# Patient Record
Sex: MALE | Race: WHITE | HISPANIC OR LATINO
[De-identification: names, ages, dates, MRNs, and addresses within clinical notes are randomized per-mention and may not be internally consistent; named-entity substitution may affect disease eponyms.]

---

## 2018-07-09 ENCOUNTER — RESULT REVIEW (OUTPATIENT)
Age: 33
End: 2018-07-09

## 2018-07-09 ENCOUNTER — OUTPATIENT (OUTPATIENT)
Dept: OUTPATIENT SERVICES | Facility: HOSPITAL | Age: 33
LOS: 1 days | Discharge: HOME | End: 2018-07-09

## 2018-07-09 RX ORDER — OXYCODONE AND ACETAMINOPHEN 5; 325 MG/1; MG/1
1 TABLET ORAL
Qty: 20 | Refills: 0
Start: 2018-07-09

## 2018-07-09 RX ORDER — DOCUSATE SODIUM 100 MG
1 CAPSULE ORAL
Qty: 21 | Refills: 0
Start: 2018-07-09 | End: 2018-07-15

## 2018-07-12 LAB — SURGICAL PATHOLOGY STUDY: SIGNIFICANT CHANGE UP

## 2021-02-27 ENCOUNTER — EMERGENCY (EMERGENCY)
Facility: HOSPITAL | Age: 36
LOS: 1 days | Discharge: HOME | End: 2021-02-27
Attending: EMERGENCY MEDICINE
Payer: COMMERCIAL

## 2021-02-27 VITALS
WEIGHT: 175.05 LBS | RESPIRATION RATE: 20 BRPM | HEIGHT: 68 IN | HEART RATE: 117 BPM | SYSTOLIC BLOOD PRESSURE: 133 MMHG | OXYGEN SATURATION: 100 % | DIASTOLIC BLOOD PRESSURE: 87 MMHG | TEMPERATURE: 98 F

## 2021-02-27 PROCEDURE — 99283 EMERGENCY DEPT VISIT LOW MDM: CPT

## 2021-02-27 PROCEDURE — 99284 EMERGENCY DEPT VISIT MOD MDM: CPT

## 2021-02-27 RX ORDER — MECLIZINE HCL 12.5 MG
25 TABLET ORAL ONCE
Refills: 0 | Status: COMPLETED | OUTPATIENT
Start: 2021-02-27 | End: 2021-02-27

## 2021-02-27 RX ORDER — SODIUM CHLORIDE 9 MG/ML
1000 INJECTION INTRAMUSCULAR; INTRAVENOUS; SUBCUTANEOUS ONCE
Refills: 0 | Status: COMPLETED | OUTPATIENT
Start: 2021-02-27 | End: 2021-02-27

## 2021-02-27 RX ADMIN — Medication 25 MILLIGRAM(S): at 23:33

## 2021-02-27 RX ADMIN — SODIUM CHLORIDE 1000 MILLILITER(S): 9 INJECTION INTRAMUSCULAR; INTRAVENOUS; SUBCUTANEOUS at 23:33

## 2021-02-27 NOTE — ED PROVIDER NOTE - PATIENT PORTAL LINK FT
You can access the FollowMyHealth Patient Portal offered by [facility] by registering at the following website: [email or web address]. By joining [facility]’s FollowMyHealth portal, you will also be able to view your health information using other applications (apps) compatible with our system.

## 2021-02-27 NOTE — ED PROVIDER NOTE - NSFOLLOWUPINSTRUCTIONS_ED_ALL_ED_FT
You were seen for vertigo.  Take all medication as directed.  For pain or fever you can ibuprofen (motrin, advil) or tylenol as needed, as directed on packaging.   Follow up with your primary care doctor within 5 days as directed.  If you had labs or imaging done, you were given copies of all of the available results. If anything is pending to result or pending official read, you will receive a call if results are positive.  If needed, call patient access services at [phone] to find a primary care doctor, or call at [phone] to make an appointment at the clinic.  Return to the ER for any worsening symptoms or concerns, including chest pain, shortness of breath, fever, chills. You were prescribed a medication called meclizine. Take your meclizine once daily as needed, for dizziness.     Vertigo    WHAT YOU NEED TO KNOW:    Vertigo is a condition that causes you to feel dizzy. You may feel that you or everything around you is moving or spinning. You may also feel like you are being pulled down or toward your side.    DISCHARGE INSTRUCTIONS:    Seek care immediately if:    You have a headache and a stiff neck.    You have shaking chills and a fever.    You vomit over and over with no relief.    You have blood, pus, or fluid coming out of your ears.    You are confused.  Contact your healthcare provider if:    Your symptoms do not get better with treatment.    You have questions about your condition or care.  Medicines:    Medicine may be given to help relieve your symptoms.    Take your medicine as directed. Contact your healthcare provider if you think your medicine is not helping or if you have side effects. Tell him or her if you are allergic to any medicine. Keep a list of the medicines, vitamins, and herbs you take. Include the amounts, and when and why you take them. Bring the list or the pill bottles to follow-up visits. Carry your medicine list with you in case of an emergency.  Manage your symptoms:    Do not drive, walk without help, or operate heavy machinery when you are dizzy.    Move slowly when you move from one position to another position. Get up slowly from sitting or lying down. Sit or lie down right away if you feel dizzy.    Drink plenty of liquids. Liquids help prevent dehydration. Ask how much liquid to drink each day and which liquids are best for you.    Vestibular and balance rehabilitation therapy (VBRT) is used to teach you exercises to improve your balance and strength. These exercises may help decrease your vertigo and improve your balance. Ask for more information about this therapy.  Follow up with your healthcare provider as directed: Write down your questions so you remember to ask them during your visits.

## 2021-02-27 NOTE — ED PROVIDER NOTE - PHYSICAL EXAMINATION
Gen: NAD; well appearing  Head: NCAT  Eyes: EOMI, PERRLA, no conjunctival pallor, no scleral icterus, bilateral horizontal nystagmus  ENT: mucous membranes moist, no discharge  Neck: neck supple  Resp: CTAB, no W/R/R  CV: RRR, +S1/S2, no M/R/G  GI: Abdomen soft non-distended, NTTP, no masses  MSK: No open wounds, no bruising, no lower extremity edema  Neuro: A&Ox4, sensation nl, motor 5/5 RUE/LUE/RLE/LLE, follows commands  Ext: no edema, no deformity, warm and well-perfused  Skin: no rash or bruising

## 2021-02-27 NOTE — ED PROVIDER NOTE - CLINICAL SUMMARY MEDICAL DECISION MAKING FREE TEXT BOX
Christopher DUNN PGY-1: 34 yo M no med hx, presenting with sudden-onset dizziness, described as room spinning sensation, most consistent with peripheral vertigo. No med hx, no trauma/injury to suggest central cause. Will give 1x bolus IVF and meclizine, and d/c home with outpatient ENT follow up.

## 2021-02-27 NOTE — ED PROVIDER NOTE - ATTENDING CONTRIBUTION TO CARE
attending Bhaskar: 35yM h/o HLD, p/w intermittent dizziness x 3 days. Reports sensation of the room spinning, exacerbated with head movement. Had another episode today, since resolved. Will treat symptomatically and reassess.

## 2021-02-27 NOTE — ED ADULT NURSE NOTE - OBJECTIVE STATEMENT
Patient is a 35 year old male complaining of dizziness. Patient denies past medical history. Patient is A&O x 4. Pt reports episode of dizziness Wednesday and today. pt states "room felt like it was spinning". pt states symptoms have resolved. pt endorses mild headache. Denies complaints of chest pain, sob, fevers, chills, n/v/d, syncope, burning urination, blood in urine, blood in stool. Abd is soft, non tender, non distended. Skin is warm and dry. Color is consistent with ethnicity. Safety and comfort maintained. Will continue to monitor.

## 2021-02-27 NOTE — ED PROVIDER NOTE - OBJECTIVE STATEMENT
36 yo M hx HLD, presenting with chief complaint of intermittent dizziness x 3 days. States that 3 days ago, felt dizzy with sensation of the room spinning which he states to be exacerbated with head movement after coming home from soccer game, sudden onset. Denies any head injury or trauma. Came in today because was playing board game with friends and had intense episode dizziness, which he notes to be improved now and denies any dizziness currently. No other symptoms, no fever/chills.

## 2021-02-27 NOTE — ED PROVIDER NOTE - NSFOLLOWUPCLINICS_GEN_ALL_ED_FT
[facility] - ENT  Otolaryngology (ENT)  [address] [place] [address]  Phone: [phone]  Fax:   Follow Up Time:

## 2021-02-28 VITALS
DIASTOLIC BLOOD PRESSURE: 67 MMHG | SYSTOLIC BLOOD PRESSURE: 108 MMHG | HEART RATE: 76 BPM | RESPIRATION RATE: 17 BRPM | TEMPERATURE: 98 F | OXYGEN SATURATION: 97 %

## 2021-02-28 RX ORDER — MECLIZINE HCL 12.5 MG
1 TABLET ORAL
Qty: 30 | Refills: 0
Start: 2021-02-28 | End: 2021-03-29

## 2021-03-01 ENCOUNTER — NON-APPOINTMENT (OUTPATIENT)
Age: 36
End: 2021-03-01

## 2021-03-05 ENCOUNTER — APPOINTMENT (OUTPATIENT)
Dept: OTOLARYNGOLOGY | Facility: CLINIC | Age: 36
End: 2021-03-05
Payer: COMMERCIAL

## 2021-03-05 ENCOUNTER — NON-APPOINTMENT (OUTPATIENT)
Age: 36
End: 2021-03-05

## 2021-03-05 VITALS
DIASTOLIC BLOOD PRESSURE: 94 MMHG | BODY MASS INDEX: 27.28 KG/M2 | HEART RATE: 82 BPM | WEIGHT: 180 LBS | SYSTOLIC BLOOD PRESSURE: 153 MMHG | HEIGHT: 68 IN | TEMPERATURE: 98 F

## 2021-03-05 DIAGNOSIS — F17.200 NICOTINE DEPENDENCE, UNSPECIFIED, UNCOMPLICATED: ICD-10-CM

## 2021-03-05 DIAGNOSIS — R42 DIZZINESS AND GIDDINESS: ICD-10-CM

## 2021-03-05 PROCEDURE — 99203 OFFICE O/P NEW LOW 30 MIN: CPT

## 2021-03-05 PROCEDURE — 99072 ADDL SUPL MATRL&STAF TM PHE: CPT

## 2021-03-05 NOTE — ASSESSMENT
[FreeTextEntry1] : Patient complains of 2 vertigo attacks x 1 week, today feeling better\par \par Vertigo no sx of primary otologic abn\par I suspect dehydration after playing soccer:\par -now resolved \par -no weakness noted today at visit \par -possibly due to dehydration, drink more water \par \par f/u prn

## 2021-03-05 NOTE — PHYSICAL EXAM
[Midline] : trachea located in midline position [Normal] : gait was normal [Hearing Loss Right Only] : normal [Hearing Loss Left Only] : normal [Nystagmus] : ~T no ~M nystagmus was seen [Fukuda Step Test] : Fukuda Step Test was Negative [Jarvis-Hallstanislawke] : Jackhorn-Hallpike: Negative [de-identified] : Normal

## 2021-03-05 NOTE — HISTORY OF PRESENT ILLNESS
[de-identified] : 35-year-old male\par Patient complains of vertigo x 1 week. States he came home from soccer practice and the room spinning lasting several seconds. Had another episode 4 days later. Went to the ED, was told he has vertigo and sent him home. Had the epley done at the ED and was negative. Today he is feeling better. No episodes since. \par Pt has no ear pain, ear drainage, hearing loss, tinnitus, nasal congestion, nasal discharge, epistaxis, sinus infections, facial pain, facial pressure, throat pain, dysphagia or fevers\par \par  [Ear Fullness] : no ear fullness [Tinnitus] : no tinnitus [Hearing Loss] : no hearing loss [Otalgia] : no otalgia [Vertigo] : vertigo [Eustachian Tube Dysfunction] : no eustachian tube dysfunction [Cholesteatoma] : no cholesteatoma [Early Onset Hearing Loss] : no early onset hearing loss [Stroke] : no stroke [Allergic Rhinitis] : no allergic rhinitis [Adenoidectomy] : no adenoidectomy [Allergies] : no allergies [Asthma] : no asthma [Hyperthyroidism] : no hyperthyroidism [Sialadenitis] : no sialadenitis [Hodgkin Disease] : no hodgkin disease [Non-Hodgkin Lymphoma] : no non-hodgkin lymphoma [None] : No risk factors have been identified. [Graves Disease] : no graves disease [Thyroid Cancer] : no thyroid cancer

## 2022-11-07 ENCOUNTER — APPOINTMENT (OUTPATIENT)
Dept: ORTHOPEDIC SURGERY | Facility: CLINIC | Age: 37
End: 2022-11-07
Payer: COMMERCIAL

## 2022-11-07 ENCOUNTER — NON-APPOINTMENT (OUTPATIENT)
Age: 37
End: 2022-11-07

## 2022-11-07 VITALS
WEIGHT: 183 LBS | HEIGHT: 69 IN | HEART RATE: 74 BPM | DIASTOLIC BLOOD PRESSURE: 72 MMHG | BODY MASS INDEX: 27.11 KG/M2 | SYSTOLIC BLOOD PRESSURE: 109 MMHG

## 2022-11-07 DIAGNOSIS — M25.569 PAIN IN UNSPECIFIED KNEE: ICD-10-CM

## 2022-11-07 DIAGNOSIS — M25.562 PAIN IN LEFT KNEE: ICD-10-CM

## 2022-11-07 PROCEDURE — 20610 DRAIN/INJ JOINT/BURSA W/O US: CPT | Mod: LT

## 2022-11-07 PROCEDURE — 99203 OFFICE O/P NEW LOW 30 MIN: CPT | Mod: 25

## 2022-11-07 NOTE — HISTORY OF PRESENT ILLNESS
[de-identified] : MOSHE LA  is a 36 year year old M who presents with a left knee injury. He was playing soccer yesterday when another player stepped in front of him which caused him to twist his knee to get out of the way. He felt his knee pop and fell to the ground. He had immediate pain and had difficulty bearing weight afterward. He was unable to continue playing. Overnight into today his left knee developed significant swelling and he has been unable to bend the knee much. He reports that his pain is about a 9/10. He went to an urgent care earlier today where he was given a knee immobilizer and an anti-inflammatory medication and told to follow up with an orthopaedic surgeon. He denies any previous knee injuries. 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

## 2022-11-07 NOTE — DISCUSSION/SUMMARY
[de-identified] : Left knee injury, likely ACL tear vs meniscus tear. I discussed with him that given the mechanism of injury, the buckling episode that he had, and the swelling of the knee, I am concerned that he tore his ACL in the left knee. He could also have a meniscus tear. Given this concern and the fact that he is unable to flex the knee past ~60 deg flexion, I recommend an MRI of the knee somewhat urgently to assess this further. If he does have an ACL tear, I discussed w/him that I would likely recommend surgery to reconstruct the ACL. Given his young age and activity level, without a functional ACL, he would be at higher risk for meniscus and cartilage damage in the future as well as for knee arthritis. He wants to continue being active and does not want to have restrictions on his activity in the future, which would necessitate a functional ACL. We will have an in depth discussion of indicaitons for surgery, the plan for surgery, and postoperative rehabilitation once the MRI is complete. Given the large effusion and inability to flex the knee, I offered an aspiration of the knee, which he was amenable to. Please see procedure note for details. I also put in an order for a autumn knee brace, which he can acquire at a surgical supply store. Once the MRI is complete, he will return to clinic to review it and I will give further recommendation on treatment plan at that time. The patient expressed understanding of the diagnosis and treatment plan and all questions were answered.\par \par  This note was generated using dragon medical dictation software.  A reasonable effort has been made for proofreading its contents, but typos may still remain.  If there are any questions or points of clarification needed please notify my office.\par \par

## 2022-11-07 NOTE — PROCEDURE
[Injection] : Injection [Left] : of the left [Knee Joint] : knee joint [Effusion] : Effusion [Patient] : patient [Risk] : risk [Benefits] : benefits [Betadine] : Betadine [___mL] : [unfilled] ~UmL of lidocaine [Lateral] : lateral [18] : an 18-gauge [___ mL Fluid] : [unfilled] mL of [Bloody] : bloody [Tolerated Well] : The patient tolerated the procedure well [None] : none

## 2022-11-07 NOTE — PHYSICAL EXAM
[de-identified] : General: No apparent distress\par Cardiovascular: extremities warm and well-perfused, no cyanosis\par Pulmonary: non labored respirations\par \par Musculoskeletal:\par \par Left knee:\par Skin is intact, warm, and dry\par Motor and sensory intact\par Toes wwp\par \par Disuse atrophy: None\par Effusion:moderate to large\par \par Active ROM:0 - 60 deg flexion\par Passive ROM:same as passive\par Crepitation: None\par \par Joint Line tenderness: None\par Patellar facet tenderness:  None\par Patellar tracking: Normal\par Patellar mobility/apprehension:none\par \par Ligamentous Exam:\par guarded, so unable to perform most maneuvers\par 2B Lachman, unable to assess anterior drawer d/t ROM restrictions\par stable w/varus and valgus stress\par \par \par Right knee:\par Skin is intact, warm, and dry\par Motor and sensory intact, toes wwp\par \par Disuse atrophy: None\par Effusion: None\par \par Active ROM: 0 - 130 deg flexion\par Passive ROM: same as active\par Crepitation: None\par \par Joint Line tenderness: None\par \par Ligamentous Exam:\par negative lachman, neg anterior and posterior drawer\par stable w/varus and valgus stress [de-identified] : 4 views of the left knee including bilateral AP and flexion AP views obtained today and interpreted by me demonstrate no acute fracture or dislocation, moderate to large knee effusion

## 2022-11-17 ENCOUNTER — APPOINTMENT (OUTPATIENT)
Dept: MRI IMAGING | Facility: CLINIC | Age: 37
End: 2022-11-17

## 2022-11-17 ENCOUNTER — OUTPATIENT (OUTPATIENT)
Dept: OUTPATIENT SERVICES | Facility: HOSPITAL | Age: 37
LOS: 1 days | End: 2022-11-17
Payer: COMMERCIAL

## 2022-11-17 ENCOUNTER — APPOINTMENT (OUTPATIENT)
Dept: ORTHOPEDIC SURGERY | Facility: CLINIC | Age: 37
End: 2022-11-17

## 2022-11-17 DIAGNOSIS — M25.562 PAIN IN LEFT KNEE: ICD-10-CM

## 2022-11-17 DIAGNOSIS — S83.512A SPRAIN OF ANTERIOR CRUCIATE LIGAMENT OF LEFT KNEE, INITIAL ENCOUNTER: ICD-10-CM

## 2022-11-17 PROCEDURE — 73721 MRI JNT OF LWR EXTRE W/O DYE: CPT

## 2022-11-17 PROCEDURE — 73721 MRI JNT OF LWR EXTRE W/O DYE: CPT | Mod: 26,LT

## 2022-11-17 PROCEDURE — 99214 OFFICE O/P EST MOD 30 MIN: CPT

## 2022-11-17 NOTE — PHYSICAL EXAM
[de-identified] : General: No apparent distress\par Cardiovascular: extremities warm and well-perfused, no cyanosis\par Pulmonary: non labored respirations\par \par Left knee:\par Mild effusion\par Range of motion: 0 to 100 degrees of flexion\par Distally neurovascularly intact\par 2B Lachman, 2+ anterior drawer\par Stable to varus and valgus stress [de-identified] : MRI of the left knee obtained today demonstrates complete ACL tear with pivot shift bone bruise pattern, tear of the medial meniscus posterior horn with extension of the tear into the posterior root, grade 1 LCL sprain

## 2022-11-17 NOTE — HISTORY OF PRESENT ILLNESS
[de-identified] : MOSHE LA  is a 36 year year old M who presents for follow-up of his left knee.  I saw him few weeks ago for a left knee injury sustained during soccer.  At that time the concern was for an ACL tear.  Aspiration of the knee was performed with relief of the pain and swelling and I referred him for an MRI.  He reports that since he was last seen, he has had a few episodes of the knee buckling on him.  He says that it has happened while walking and he has not tried to play any sports since the injury.  He reports that the pain is improved since he was last seen as well as the swelling.  He had the MRI this morning and comes in to review it.

## 2022-11-17 NOTE — DISCUSSION/SUMMARY
[de-identified] : Left knee ACL tear with medial meniscus tear, extending into the posterior root, as well as grade 1 LCL sprain. We discussed operative versus nonoperative management as well as risks and benefits of surgery and timeline to return to play. I have recommended an ACL reconstruction with hamstring autograft. This recommendation is based on the patient's desire to continue being active and participating in sports, and to therefore prevent the incidence of further meniscal and articular cartilage degeneration.  I will also address his meniscus tear at the time of surgery.  I will do my best to repair the meniscus as best I can, however based on the quality and type of meniscus tear, I may have to remove the torn part of his meniscus.  I discussed that the grade 1 LCL sprain that he has could be treated nonoperatively.\par \par The choice of graft was made after a discussion of the relative pros and cons of various grafts. Based on this, I recommended use of hamstring autograft\par \par The timing of the surgery will be decided based upon satisfying the following three criteria: 1. No flexion contracture; 2. Full knee range of motion beyond 100 degrees flexion and; 3. Minimal to no effusion in the knee.  He appears to have meet these criteria by now.\par \par We had a lengthy discussion about the risks associated with the proposed procedure. In particular we discussed risks that include, but are not limited to infection, blood loss, potential transient or permanent injury to nerves or blood vessels, joint stiffness, persistent pain, need for future operation, failure of healing, wound complications, failure of therapeutic intervention, risk of re-injury, deep vein thrombosis, pulmonary embolism, and death.\par \par We discussed the proposed rehabilitation timeline as well as expected postoperative restrictions. The patient voiced a good understanding of treatment options, risks and benefits, postoperative instructions, rehabilitation timeline, and restrictions. The patient was given the opportunity to ask questions, which were all answered to the best of my ability and to their satisfaction. The patient will work with my office to arrange a time for surgery and obtain any medical clearance information necessary. My pre-operative information packet, which details the process and answers many FAQ’s will be provided. The patient was encouraged to call the office with any further questions or concerns.

## 2022-11-30 ENCOUNTER — OUTPATIENT (OUTPATIENT)
Dept: OUTPATIENT SERVICES | Facility: HOSPITAL | Age: 37
LOS: 1 days | End: 2022-11-30
Payer: COMMERCIAL

## 2022-11-30 VITALS
OXYGEN SATURATION: 99 % | WEIGHT: 179.02 LBS | TEMPERATURE: 99 F | RESPIRATION RATE: 12 BRPM | HEART RATE: 76 BPM | SYSTOLIC BLOOD PRESSURE: 146 MMHG | HEIGHT: 69 IN | DIASTOLIC BLOOD PRESSURE: 81 MMHG

## 2022-11-30 DIAGNOSIS — S89.92XA UNSPECIFIED INJURY OF LEFT LOWER LEG, INITIAL ENCOUNTER: ICD-10-CM

## 2022-11-30 DIAGNOSIS — Z98.890 OTHER SPECIFIED POSTPROCEDURAL STATES: Chronic | ICD-10-CM

## 2022-11-30 DIAGNOSIS — Z01.818 ENCOUNTER FOR OTHER PREPROCEDURAL EXAMINATION: ICD-10-CM

## 2022-11-30 DIAGNOSIS — S83.512A SPRAIN OF ANTERIOR CRUCIATE LIGAMENT OF LEFT KNEE, INITIAL ENCOUNTER: ICD-10-CM

## 2022-11-30 PROCEDURE — G0463: CPT

## 2022-11-30 NOTE — H&P PST ADULT - NSICDXPASTMEDICALHX_GEN_ALL_CORE_FT
PAST MEDICAL HISTORY:  COVID-19 vaccine series completed     History of 2019 novel coronavirus disease (COVID-19) 2021     PAST MEDICAL HISTORY:  COVID-19 vaccine series completed     History of 2019 novel coronavirus disease (COVID-19) 2021    Left knee injury

## 2022-11-30 NOTE — H&P PST ADULT - HEIGHT IN FEET
As discussed please take the antibiotics exactly as prescribed.  Continue to do warm soaks with Epson salt 3 times per day for the next 3 days.  Follow-up with your primary care physician for recheck of the wound.  Return to the ER should he develop any new, worsening or other concerning symptoms such as those listed below.    Contact a doctor if:  You feel worse.  You do not get better.  You have more fluid, blood, or pus coming from the affected area.  Your finger or knuckle is swollen or is hard to move.  Get help right away if you have:  A fever or chills.  Redness spreading from the affected area.  Pain in a joint or muscle.   5

## 2022-11-30 NOTE — H&P PST ADULT - ASSESSMENT
37 yo male is scheduled for left knee anterior cruciate ligament reconstruction with hamstring autograft medial meniscus repair vs meniscectomy, possible other repairs on 12/8/2022

## 2022-11-30 NOTE — H&P PST ADULT - HISTORY OF PRESENT ILLNESS
36  37 yo male reports soccer injury to his left knee "a few weeks ago" which resulted in pain and swelling which has resolved now.  He is scheduled for left knee anterior cruciate ligament reconstruction with hamstring autograft medial meniscus repair vs meniscectomy, possible other repairs on 12/8/2022 @ [facility].

## 2022-11-30 NOTE — H&P PST ADULT - NSICDXFAMILYHX_GEN_ALL_CORE_FT
FAMILY HISTORY:  Uncle  Still living? Yes, Estimated age: Age Unknown  Family history of stroke, Age at diagnosis: Age Unknown

## 2022-11-30 NOTE — H&P PST ADULT - PAIN SCALE PREFERRED, PROFILE
Pre and post operative expectations and routines explained to patient, verbalized understanding. numerical 0-10

## 2022-11-30 NOTE — H&P PST ADULT - PROBLEM SELECTOR PLAN 1
left knee anterior cruciate ligament reconstruction with hamstring autograft medial meniscus repair vs meniscectomy, possible other procedures is planned for 12/8/2022  Covid testing TBS 12/5/2022 @ [facility]  pre op instructions were reviewed; best wishes offered

## 2022-12-05 LAB — SARS-COV-2 N GENE NPH QL NAA+PROBE: NOT DETECTED

## 2022-12-07 ENCOUNTER — TRANSCRIPTION ENCOUNTER (OUTPATIENT)
Age: 37
End: 2022-12-07

## 2022-12-08 ENCOUNTER — TRANSCRIPTION ENCOUNTER (OUTPATIENT)
Age: 37
End: 2022-12-08

## 2022-12-08 ENCOUNTER — OUTPATIENT (OUTPATIENT)
Dept: OUTPATIENT SERVICES | Facility: HOSPITAL | Age: 37
LOS: 1 days | End: 2022-12-08
Payer: COMMERCIAL

## 2022-12-08 ENCOUNTER — APPOINTMENT (OUTPATIENT)
Dept: ORTHOPEDIC SURGERY | Facility: HOSPITAL | Age: 37
End: 2022-12-08

## 2022-12-08 VITALS
SYSTOLIC BLOOD PRESSURE: 133 MMHG | OXYGEN SATURATION: 99 % | HEART RATE: 90 BPM | DIASTOLIC BLOOD PRESSURE: 75 MMHG | RESPIRATION RATE: 18 BRPM

## 2022-12-08 VITALS
WEIGHT: 175.27 LBS | DIASTOLIC BLOOD PRESSURE: 84 MMHG | HEART RATE: 80 BPM | HEIGHT: 69 IN | OXYGEN SATURATION: 100 % | TEMPERATURE: 98 F | SYSTOLIC BLOOD PRESSURE: 136 MMHG | RESPIRATION RATE: 24 BRPM

## 2022-12-08 DIAGNOSIS — S83.512A SPRAIN OF ANTERIOR CRUCIATE LIGAMENT OF LEFT KNEE, INITIAL ENCOUNTER: ICD-10-CM

## 2022-12-08 DIAGNOSIS — Z98.890 OTHER SPECIFIED POSTPROCEDURAL STATES: Chronic | ICD-10-CM

## 2022-12-08 PROCEDURE — 29882 ARTHRS KNE SRG MNISC RPR M/L: CPT | Mod: LT

## 2022-12-08 PROCEDURE — 29888 ARTHRS AID ACL RPR/AGMNTJ: CPT | Mod: LT

## 2022-12-08 DEVICE — PIN GUIDE FLEX MUST ORDER IN MULT OF 5: Type: IMPLANTABLE DEVICE | Site: LEFT | Status: FUNCTIONAL

## 2022-12-08 DEVICE — ANCHOR SUT FIBERSTITCH 2-0 CRVD: Type: IMPLANTABLE DEVICE | Site: LEFT | Status: FUNCTIONAL

## 2022-12-08 DEVICE — IMP ABS TIGHTROPE: Type: IMPLANTABLE DEVICE | Site: LEFT | Status: FUNCTIONAL

## 2022-12-08 DEVICE — ARTHREX SECONDARY FIXATION WITH PEEK SWIVELOCK ANCHOR 4.75 X 19.1MM: Type: IMPLANTABLE DEVICE | Site: LEFT | Status: FUNCTIONAL

## 2022-12-08 DEVICE — IMP TIGHTROPE ABS BUTTON 8X12MM: Type: IMPLANTABLE DEVICE | Site: LEFT | Status: FUNCTIONAL

## 2022-12-08 DEVICE — IMP TIGHTROPE ACL UHMWPE: Type: IMPLANTABLE DEVICE | Site: LEFT | Status: FUNCTIONAL

## 2022-12-08 RX ORDER — SODIUM CHLORIDE 9 MG/ML
1000 INJECTION, SOLUTION INTRAVENOUS
Refills: 0 | Status: DISCONTINUED | OUTPATIENT
Start: 2022-12-08 | End: 2022-12-09

## 2022-12-08 RX ORDER — CEFAZOLIN SODIUM 1 G
2000 VIAL (EA) INJECTION ONCE
Refills: 0 | Status: COMPLETED | OUTPATIENT
Start: 2022-12-08 | End: 2022-12-08

## 2022-12-08 RX ORDER — ONDANSETRON 8 MG/1
1 TABLET, FILM COATED ORAL
Qty: 6 | Refills: 3
Start: 2022-12-08

## 2022-12-08 RX ORDER — CHLORHEXIDINE GLUCONATE 213 G/1000ML
1 SOLUTION TOPICAL ONCE
Refills: 0 | Status: COMPLETED | OUTPATIENT
Start: 2022-12-08 | End: 2022-12-08

## 2022-12-08 RX ORDER — HYDROMORPHONE HYDROCHLORIDE 2 MG/ML
0.5 INJECTION INTRAMUSCULAR; INTRAVENOUS; SUBCUTANEOUS
Refills: 0 | Status: DISCONTINUED | OUTPATIENT
Start: 2022-12-08 | End: 2022-12-09

## 2022-12-08 RX ORDER — OXYCODONE HYDROCHLORIDE 5 MG/1
1 TABLET ORAL
Qty: 30 | Refills: 0
Start: 2022-12-08

## 2022-12-08 RX ORDER — MELOXICAM 15 MG/1
1 TABLET ORAL
Qty: 21 | Refills: 0
Start: 2022-12-08 | End: 2022-12-28

## 2022-12-08 RX ORDER — ONDANSETRON 8 MG/1
4 TABLET, FILM COATED ORAL ONCE
Refills: 0 | Status: COMPLETED | OUTPATIENT
Start: 2022-12-08 | End: 2022-12-08

## 2022-12-08 RX ORDER — OXYCODONE HYDROCHLORIDE 5 MG/1
5 TABLET ORAL ONCE
Refills: 0 | Status: DISCONTINUED | OUTPATIENT
Start: 2022-12-08 | End: 2022-12-09

## 2022-12-08 RX ORDER — OMEPRAZOLE 10 MG/1
1 CAPSULE, DELAYED RELEASE ORAL
Qty: 30 | Refills: 1
Start: 2022-12-08 | End: 2023-02-05

## 2022-12-08 RX ORDER — APREPITANT 80 MG/1
40 CAPSULE ORAL ONCE
Refills: 0 | Status: COMPLETED | OUTPATIENT
Start: 2022-12-08 | End: 2022-12-08

## 2022-12-08 RX ADMIN — CHLORHEXIDINE GLUCONATE 1 APPLICATION(S): 213 SOLUTION TOPICAL at 07:13

## 2022-12-08 RX ADMIN — APREPITANT 40 MILLIGRAM(S): 80 CAPSULE ORAL at 07:12

## 2022-12-08 RX ADMIN — SODIUM CHLORIDE 75 MILLILITER(S): 9 INJECTION, SOLUTION INTRAVENOUS at 12:40

## 2022-12-08 RX ADMIN — ONDANSETRON 4 MILLIGRAM(S): 8 TABLET, FILM COATED ORAL at 13:02

## 2022-12-08 NOTE — ASU PREOP CHECKLIST - PATIENT'S PERSONAL PROPERTY GIVEN TO
What Type Of Note Output Would You Prefer (Optional)?: Standard Output How Severe Is Your Rash?: mild Is This A New Presentation, Or A Follow-Up?: Follow Up Rash on unit/security/safe

## 2022-12-08 NOTE — BRIEF OPERATIVE NOTE - OPERATION/FINDINGS
ACL complete rupture  Stable ACL reconstruction Post procedure. Fluoro images with stable implant placement on femoral side.

## 2022-12-08 NOTE — ASU DISCHARGE PLAN (ADULT/PEDIATRIC) - CARE PROVIDER_API CALL
Landry Nash)  Orthopedics  [address], [address]  [place] [address]  Phone: [phone]  Fax: [phone]  Established Patient  Scheduled Appointment: 12/19/2022

## 2022-12-08 NOTE — ASU PATIENT PROFILE, ADULT - FALL HARM RISK - UNIVERSAL INTERVENTIONS
Bed in lowest position, wheels locked, appropriate side rails in place/Call bell, personal items and telephone in reach/Instruct patient to call for assistance before getting out of bed or chair/Non-slip footwear when patient is out of bed/[place] to call system/Physically safe environment - no spills, clutter or unnecessary equipment/Purposeful Proactive Rounding/Room/bathroom lighting operational, light cord in reach

## 2022-12-08 NOTE — ASU DISCHARGE PLAN (ADULT/PEDIATRIC) - ASU DC SPECIAL INSTRUCTIONSFT
Follow instructions in the attached brochure for bandage care, ice paks, and exercises.  You may walk with Toe touch  weight on [ Left ]  knee (10%); use crutches to assist as needed.  Brace: Wear on [ Left ] knee daily when up & walking; open brace for ice paks; may allow to hinge to 90 deg. for knee exercises; may wear at night for comfort when sleeping.  See the Surgeon in the office for removal of your stitches in 10-14 days.  Call the Surgeon. for fever, severe pain, or redness around the incisions.

## 2022-12-08 NOTE — ASU PATIENT PROFILE, ADULT - HAVE YOU RECEIVED AT LEAST TWO PFIZER AND/OR MODERNA VACCINATIONS (IN ANY COMBINATION) AND/OR ONE JOHNSON & JOHNSON VACCINATION?
Called Julisa to reschedule her appointment off of 7-24-20  (provider on vacation)    Julisa said she will check her work schedule and call me back.    Melba    
Yes

## 2022-12-08 NOTE — BRIEF OPERATIVE NOTE - TYPE OF ANESTHESIA
  reporte d.      PHYSICAL EXAM    CONSTITUTIONAL: Vital signs (BP, P, T, RR, et al) noted. No apparent distress. Does not  appear acutely ill or septic. Appears adequately hydrated.  HEAD: Normocephalic.  ENT: Oropharynx moist.  PULM: Lungs clear. Breathing unlabored.  CV: Heart regular.  DERM: Warm and  moist.  PSYCHIATRIC: Alert and oriented x 3. Mood is grossly neutral. Affect appropriate. Judgment and insight not grossly compromised.  General

## 2022-12-09 PROCEDURE — 29882 ARTHRS KNE SRG MNISC RPR M/L: CPT | Mod: LT

## 2022-12-09 PROCEDURE — 29888 ARTHRS AID ACL RPR/AGMNTJ: CPT | Mod: LT

## 2022-12-09 PROCEDURE — 97161 PT EVAL LOW COMPLEX 20 MIN: CPT

## 2022-12-09 PROCEDURE — C1713: CPT

## 2022-12-09 PROCEDURE — 76000 FLUOROSCOPY <1 HR PHYS/QHP: CPT

## 2022-12-21 ENCOUNTER — APPOINTMENT (OUTPATIENT)
Dept: ORTHOPEDIC SURGERY | Facility: CLINIC | Age: 37
End: 2022-12-21

## 2022-12-21 VITALS — TEMPERATURE: 98.2 F | DIASTOLIC BLOOD PRESSURE: 75 MMHG | SYSTOLIC BLOOD PRESSURE: 113 MMHG | HEART RATE: 87 BPM

## 2022-12-21 DIAGNOSIS — Z98.890 OTHER SPECIFIED POSTPROCEDURAL STATES: ICD-10-CM

## 2022-12-21 PROCEDURE — 73560 X-RAY EXAM OF KNEE 1 OR 2: CPT | Mod: LT

## 2022-12-21 PROCEDURE — 99024 POSTOP FOLLOW-UP VISIT: CPT

## 2022-12-21 NOTE — HISTORY OF PRESENT ILLNESS
[de-identified] : He is 2 weeks status post left ACL reconstruction with hamstring autograft and medial meniscus repair.  He is doing well overall.  He says his pain is much improved.  He was actually able to go back to work.  He says initially when he did go back to work he may have overdone it and felt some calf pain.  He says that this Pain has resolved.  He has been wearing the brace as prescribed and has been doing the exercises that were prescribed to him as well.  He says the swelling is much better.  He has noticed some numbness on the anterior aspect of his proximal shin.  He denies any fevers or chills. [de-identified] : General: No apparent distress\par Cardiovascular: extremities warm and well-perfused, no cyanosis\par Pulmonary: non labored respirations\par \par Left knee:\par Incisions well-healed, sutures removed today\par Mild soft tissue swelling, minimal to no effusion\par Range of motion: 2 to 60 degrees of flexion\par Negative Lachman\par Negative Homans' sign, no calf pain, swelling, or tenderness\par Lower extremity warm and well-perfused [de-identified] : 2 views of the left knee obtained today and interpreted by me demonstrate postoperative changes after ACL reconstruction.  The cortical buttons on the femur and tibia are intact [de-identified] : First postoperative visit after left ACL reconstruction with hamstring autograft and medial meniscus repair [de-identified] : He is doing well overall.  His arthroscopic pictures were given to him today and details of the procedure were discussed with him.  He says that he did not even really use the oxycodone that was prescribed.  He occasionally is taking some Tylenol, but overall says that he does not really need much pain medication at this point.  I discussed with him that the numbness that he has on the anterior proximal shin is likely due to the incision and can improve with time.  He should follow the PT protocol that was given to him.  At this time he should work on knee range of motion.  I also discussed that he can wean off the crutches over the next month and also wean off of the brace at home over the next month as well.  A prescription for PT was given today.  I will see him back in 4 weeks for repeat evaluation.  The patient expressed understanding of his diagnosis and treatment plan and all questions were answered.\par \par This note was generated using dragon medical dictation software.  A reasonable effort has been made for proofreading its contents, but typos may still remain.  If there are any questions or points of clarification needed please notify my office.

## 2022-12-29 ENCOUNTER — TRANSCRIPTION ENCOUNTER (OUTPATIENT)
Age: 37
End: 2022-12-29

## 2023-01-05 PROBLEM — S89.92XA UNSPECIFIED INJURY OF LEFT LOWER LEG, INITIAL ENCOUNTER: Chronic | Status: ACTIVE | Noted: 2022-11-30

## 2023-01-10 NOTE — H&P PST ADULT - NSCAFFEAMTFREQ_GEN_ALL_CORE_SD
1/10/2023         RE: Yahaira Fuentes  [address] [facility] [address]        Dear Colleague,    Thank you for referring your patient, Yahaira Fuentes, to the [facility] [place]. Please see a copy of my visit note below.         BMT / Cell Therapy Progress Note      Yahaira Fuentes is a 65 year old female referred by Dr. Emmanuel Arshad for acute myeloid leukemia.      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 She by PCP, WBC of 60.   8/5/2022 BMB:  - Acute myeloid leukemia with 87% blasts (predominately monoblasts) with mutated NPM1.   - A FLT3 ITD mutation detected , ratio 0.58  - Flow Positive for increased blast population with monocytoid differentiation. 84% of cells are in the monocyte/blast gate (dim to moderate CD45/low to moderate side scatter) and show expression of HLA-DR, partial CD13, CD4, CD64, partial CD14, CD33, CD15, CD11b and possible partial dim MPO.  They are negative for the remaining markers including CD34.    - CG: normal female karyotype      - AML Panel by NGS   TIER 1: Variants of Known Clinical Significance    1. FLT3 c.1738_1794dup, p.[id number] (NM_[id number])   VAF: Not Reported   2. NPM1 c.860_863dup, p.Mar122ri (NM_[id number])   VAF: 35.2%   3. DNMT3A c.2645G>A, p.Osu268Tco (NM_[id number])   VAF: 39.7%   TIER 2: Variants of Unknown Clinical Significance     1. KIT c.200C>G, p.Stl16Fnj (NM_[id number])   VAF: 47.5%   Given that the variant frequency is close to 50%, it is unclear whether this is a germline or somatic variant. The clinical significance, if any, is uncertain.   - Peripheral blood, morphology: Acute  Problem: Skin Integrity:  Goal: Will show no infection signs and symptoms  Description: Will show no infection signs and symptoms  Outcome: Ongoing  Note: No new skin breakdown or red bony prominences occuring this shift. Pt repositioned with rounding using pillow support. Protective wipes used PRN. Skin will be kept clean and dry. Repositioning and turning encouraged. Goal: Absence of new skin breakdown  Description: Absence of new skin breakdown  Outcome: Ongoing  Note: Turn/reposition and moisture barrier cream to maintain intact skin and decrease r/o skin breakdown with incont episodes. Problem: HEMODYNAMIC STATUS  Goal: Patient has stable vital signs and fluid balance  Outcome: Ongoing  Note: . Continue check vital signs per unit policy as well as PRN. HTN managed by IV antihypertensives. BP will respond to medication. Problem: ACTIVITY INTOLERANCE/IMPAIRED MOBILITY  Goal: Mobility/activity is maintained at optimum level for patient  Outcome: Ongoing  Note: Baseline wheelchair bound with R sided weakness from previous CVA. Encourage pt to assist with personal care, assist with turning. PT/OT ordered. Problem: COMMUNICATION IMPAIRMENT  Goal: Ability to express needs and understand communication  Outcome: Ongoing  Note: Aphasic, can say very simple/1 syllable words although slurred. Uses hand gestures, facial expressions, nodding/shaking head and working with writing to improve communication     Problem: Neurological  Goal: Maximum potential motor/sensory/cognitive function  Outcome: Ongoing  Note: Pt is a/o x4. No apparent decreased cognitive function. Sensory function intact. Motor function encouraged with working on tasks such as personal care, taking medications. Turn/reposition      Problem: Respiratory:  Goal: Ability to maintain a clear airway will improve  Description: Ability to maintain a clear airway will improve  Outcome: Ongoing  Note: LS clear.  Incentive spirometer "myeloid leukemia with 77% blasts. WBC 43390, hgb 11.3, plt 42553     9/6/2022 BMB:  Bone marrow aspirate, clot section, and trephine core biopsy:    Normocellular marrow with trilinear hematopoiesis    1% blasts    FLOW CYTOMETRY:Not performed    CYTOGENETICS/ MOLECULAR/ FISH: Not submitted     Peripheral blood, morphology: WBC 2.1, hb 8.4, plt [id number]    Negative for circulating blasts/blast equivalents    Date Treatment Name Response Side Effects / Toxicities   8/8/2022 Cytarabine and idarubicin (\"7+3\") with midostaurin therapy   Midostaurin: 8/15-8/28 9/6/2022 morphologic and immunophenotypic CR -Neutropenic fever cx neg completed cefepime 9/1  -ROHIT-Max creat 1.27.   11/29/22 SLAVA allo HSCT            HPI:  Please see my entry above for hematologic history.  Ms. Fuentes is seen today for follow up of her restaging studies done 1/5.  These unfortunately showed early relapse of AML with 25% blasts.  I spoke with her by phone yesterday to review this and we discussed further today.  She is understandably distressed by this new but denies any acute health concerns today and would like to discuss next therapy steps.      ASSESSMENT AND PLAN:  We reviewed the results of her bone marrow biopsy in detail today.  This shows recurrent acute myeloid leukemia with 25% abnormal myeloid blasts by flow.  FISH/cytogenetics are still pending but Ms. Fuentes previously did have a FLT3-ITD mutation as noted above.    We discussed next treatment options in detail.  I would favor starting azacitidine/venetoclax with plan for eventual DLI.  I did discuss with Ms. Fuentes and her daughter that the overall success rate of DLI is approximately 20%.  She understands that in general the prognosis in this scenario is not good but would like to pursue further treatment if able.  We reviewed the risks of DLI, particularly GVHD.  As she is still very early after transplant she will need to continue immunosuppression with sirolimus until " encouraged  Goal: Absence of aspiration  Description: Absence of aspiration  12/12/2021 2044 by Chris Minor RN  Outcome: Ongoing  Note: Providing oral care with suction, suction at bedside at all times.  HOB up at least 30 degrees  12/12/2021 2042 by Chris Minor RN  Outcome: Ongoing     Problem: Safety:  Goal: Ability to chew and swallow food without choking will improve  Description: Ability to chew and swallow food without choking will improve  12/12/2021 2044 by Chris Minor RN  Outcome: Ongoing  12/12/2021 2042 by Chris Minor RN  Outcome: Ongoing  Goal: Ability to demonstrate good, daily oral hygiene techniques will improve  Description: Ability to demonstrate good, daily oral hygiene techniques will improve  Outcome: Ongoing  Goal: Maintenance of upright position during and after feeding  Description: Maintenance of upright position during and after feeding  Outcome: Ongoing D+60.  I recommend stopping at that point without a taper in order to enable graft versus leukemia effect.  This will increase her risk of GVH which we discussed today but in the setting of relapsed leukemia I would consider this a necessary risk.    We had a ron discussion regarding the poor prognosis of early relapse and the relatively low rate of success with DLI in this context.  She does not qualify for any of our current clinical trials and will not in the near future.  As she does need to start therapy soon I recommend starting therapy as above as soon as possible.  She and her daughter were in agreement with this.  They would like to resume care with their primary oncologist, Dr. Arshad.  I spoke with him by phone today to discuss next steps.  He will arrange venetoclax/azacitidine and set up appointments for Ms. Fuentes; I asked to repeat a bone marrow biopsy after 2 cycles to assess response and hopefully proceed with DLI at that time.    Ms. Fuentes should keep her currently scheduled follow up with us for labs and possible transfusions until she has set appointments with Dr. Arshad.      Plan:   - Recommend starting venetoclax/azacitidine  - Follow up pending FISH/cytogenetics  - Continue sirolimus until D+60, then stop without taper  - Please repeat a bone marrow biopsy after 2 cycles  - We will arrange DLI and tentatively plan to do this after 2 cycles  - Hold bactrim, start pentamidine    I spent 40 minutes in the care of this patient today, which included time necessary for preparation for the visit, obtaining history, ordering medications/tests/procedures as medically indicated, review of pertinent medical literature, counseling of the patient, communication of recommendations to the care team, and documentation time.    Bernard Yuan MD    ROS:    10 point ROS neg other than the symptoms noted above in the HPI.        Current Outpatient Medications   Medication Sig Dispense Refill     acyclovir  "(ZOVIRAX) 800 MG tablet Take 1 tablet (800 mg) by mouth 2 times daily 60 tablet 0     Ascorbic Acid (RITESH-C PO) Take 1 tablet by mouth daily \"Vitamin C extra with Zinc\"       cholecalciferol 25 MCG (1000 UT) TABS Take 1,000 Units by mouth daily       fluconazole (DIFLUCAN) 200 MG tablet Take 1 tablet (200 mg) by mouth daily 30 tablet 0     multivitamin w/minerals (THERA-VIT-M) tablet Take 1 tablet by mouth daily       pantoprazole (PROTONIX) 40 MG EC tablet Take 1 tablet (40 mg) by mouth 2 times daily (before meals) 60 tablet 0     potassium chloride ER (KLOR-CON M) 20 MEQ CR tablet Take 60 mEq (3 pills) on 1/3/23; 40mEq (2 pills) on 1/4/23; 20 mEq (1 pill) on 1/5/23; clinic will direct further thereafter. 30 tablet 1     psyllium (METAMUCIL/KONSYL) capsule Take 1 capsule by mouth daily for 30 days 30 capsule 0     sirolimus (GENERIC EQUIVALENT) 1 MG tablet Take 3 tablets (3 mg) by mouth daily THIS IS A TEST SCRIPT ONLY, DO NOT FILL. 30 tablet 0     sulfamethoxazole-trimethoprim (BACTRIM DS) 800-160 MG tablet Take 1 tablet by mouth Every Mon, Wed, Fri Morning DO NOT BEGIN TAKING UNTIL INSTRUCTED TO DO SO BY BMT CLINIC ON FOLLOW UP VISIT. Plan to start next month if GI sxms have improved. 16 tablet 0     triamcinolone (KENALOG) 0.1 % external ointment Apply topically 2 times daily as needed (rash) 80 g 0     ursodiol (ACTIGALL) 300 MG capsule Take 1 capsule (300 mg) by mouth 3 times daily 105 capsule 0     zinc oxide (DESITIN) 20 % external ointment Apply topically every hour as needed for irritation 425 g 0         Physical Exam:     Vital Signs: /62   Pulse 101   Temp 99.2  F (37.3  C) (Axillary)   Resp 16   Wt 93.6 kg (206 lb 4.8 oz)   SpO2 100%   BMI 36.55 kg/m      KPS:  80  General Appearance: sitting up in chair, alert  Eyes: PERRL, conjunctiva and lids normal, sclera nonicteric  Cardio/Vascular: extremities WWP  Resp Effort And Auscultation: Breathing comfortably on room air  Musculoskeletal: " Musculoskeletal normal  Edema: none  Skin: Skin color, texture, turgor normal. No rashes or lesions in visualized areas.  Psych/Affect: Mood and affect are appropriate.    Blood Counts     Recent Labs   Lab Test 01/10/23  0742 01/05/23  0642 01/03/23  0912 01/01/23  0840 12/30/22  0740 12/28/22  0758 12/27/22  0506 12/26/22  0304   HGB 6.6* 7.4* 7.7*   < > 7.7*   < > 7.9* 8.0*   HCT 20.3* 22.8* 23.5*   < > 23.8*   < > 24.5* 24.2*   WBC 2.8* 1.8* 1.6*   < > 1.3*   < > 3.1* 2.4*   ANEUTAUTO  --   --   --   --  1.0*  --  2.7 2.2   ALYMPAUTO  --   --   --   --  0.1*  --  0.0* 0.0*   AMONOAUTO  --   --   --   --  0.2  --  0.3 0.2   AEOSAUTO  --   --   --   --  0.0  --  0.0 0.0   ABSBASO  --   --   --   --  0.0  --  0.0 0.0   NRBCMAN  --   --   --   --  0.0  --  0.0 0.0   PLT 43* 32* 30*   < > 15*   < > 15* 22*    < > = values in this interval not displayed.       ABO/RH    Recent Labs   Lab Test 01/10/23  0742   ABORH AB POS         Chemistries     Basic Panel  Recent Labs   Lab Test 01/10/23  0742 01/05/23  0642 01/03/23  0912    138 142   POTASSIUM 3.4 3.8 2.7*   CHLORIDE 105 104 109*   CO2 25 23 23   BUN 8.0 8.5 8.1   CR 0.72 0.68 0.53   * 153* 155*        Calcium, Magnesium, Phosphorus  Recent Labs   Lab Test 01/10/23  0742 01/05/23  0642 01/03/23  0912 12/28/22  0758 12/27/22  0506 12/26/22  0304 12/25/22  0158   IRA 8.3* 8.5* 7.2*   < > 8.6* 8.9 8.4*   MAG  --   --   --   --  1.9 2.0 1.9   PHOS  --   --   --   --  3.1 3.3 3.0    < > = values in this interval not displayed.        LFTs  Recent Labs   Lab Test 01/05/23  0642 12/26/22  0304 12/22/22  0429   BILITOTAL 0.3 0.2 0.2   ALKPHOS 64 74 65   AST 29 19 15   ALT 18 10 6*   ALBUMIN 3.7 3.6 3.7       Infectious Disease Markers     [facility] IDM    Recent Labs   Lab Test 11/03/22  1030   TCRUZI Non-Reactive         Hepatitis and HIV    Recent Labs   Lab Test 11/03/22  1030   HEPBANG Nonreactive   HBCAB Nonreactive   AUSAB 2.15   HCVAB  Nonreactive   HIAGAB Nonreactive         CMV  Recent Labs   Lab Test 11/03/22  1030   CMVIGG No detectable antibody.         EBV    Recent Labs   Lab Test 11/03/22  1030   EBVCAG Positive*       Bone Marrow Biopsy       Morphology    Results for orders placed or performed in visit on 01/05/23 (from the past 8760 hour(s))   Bone marrow biopsy   Result Value    Final Diagnosis      Bone marrow, posterior iliac crest, right decalcified trephine biopsy and touch imprint; direct aspirate smear, and concentrated aspirate smear; and peripheral blood smear:      -Persistent/recurrent acute myeloid leukemia involving approximately 40% of moderately hypercellular bone marrow [80% cellular] with marked dysgranulopoiesis and 26.8% blasts by differential count.  - Peripheral blood showing marked pancytopenia with very rare circulating blasts(<1%).  - See comment      Comment      Flow cytometry analysis on concurrent specimen ([id number]) showed increased abnormal myeloid blasts [25%]. Overall, the features of this marrow are consistent with relapsed/recurrent acute myeloid leukemia.    Concurrent ancillary studies are in progress and will be reported separately. Correlation with the results of ancillary tests and clinical findings is recommended.      Clinical Information      Per [facility] records, the patient is a 65-year-old woman with history of acute myeloid leukemia with monocytic differentiation, mutated NPM1 and FLT3 ITD mutation, who is now Day 37 s/p allo-SCT. She has required transfusion support including multiple units of pRBCs (most recent 12/24/22) and platelets (most recent 12/25/22) as well as G-SCF therapy (12/27/22). This is a routine follow up biopsy.      Peripheral Hematologic Data      CBC WITH MANUAL DIFFERENTIAL (01/05/2023 07:03 AM CST):     RESULT VALUE REF. RANGE UNITS    WBC Count    Hemoglobin    Hematocrit    Platelet Count    RBC Count   MCV  MCH  MCHC  RDW  1.8  ( L )     7.4  ( L )      22.8  ( L )    32  ( LL )   2.54  ( L )       90 (NORMAL)     29.1 (NORMAL)     32.5 (NORMAL)     14.6 (NORMAL) 4.0-11.0  11.7-15.7  35.0-47.0  150-450  3.80-5.20    26.5-33.0  31.5-36.5  10.0-15.0 10e3/uL  g/dL  %  10e3/uL  10e6/uL  fL  pg  g/dL  %   % Neutrophils  % Lymphocytes  % Monocytes  % Eosinophils  % Basophils  % Metamyelocytes  % Myelocytes  % Promyelocytes  % Blasts  % Plasma Cells  % Other Cells  Absolute Neutrophils   Absolute Lymphocytes   Absolute Monocytes  Absolute Eosinophils  Absolute Basophils  Absolute Metamyelocytes  Absolute Myelocytes  Absolute Promyelocytes  Absolute Blasts  Absolute Plasma Cells  Absolute Other Cells  NRBCs per 100 WBC  Absolute NRBCs 87  5  2  6  0               1.6 (NORMAL)      0.1  ( L )     0.0 (NORMAL)     0.1 (NORMAL)     0.0 (NORMAL)   ()   ()   ()       ()   ()   ()   ()      () N/A  N/A  N/A  N/A  N/A  N/A  N/A  N/A  N/A  N/A  N/A  1.6-8.3  0.8-5.3  0.0-1.3  0.0-0.7  0.0-0.2  <=0.0  <=0.0  <=0.0  <=0.0  <=0.0  <=0.0  <=0  <=0.0 %  %  %  %  %  %  %  %  %  %  %  10e3/uL  10e3/uL  10e3/uL  10e3/uL  10e3/uL  10e3/uL  10e3/uL  10e3/uL  10e3/uL  10e3/uL  10e3/uL  %  10e3/uL         Microscopic Description      PERIPHERAL BLOOD SMEAR MORPHOLOGY:  The red blood cells appear normochromic. Poikilocytosis  includes occasional ovalocytes and rare dacrocytes . Polychromasia is present, but not increased. Rouleaux formation is not increased. The morphology of the platelets is predominantly normal, though a subset of hypogranular forms are appreciated.    Lymphocyte morphology is polymorphous. Neutrophils displays predominantly normal cytoplasmic granularity and unremarkable nuclear morphology; rare cells show pseudo-Pelger-Huët nuclear morphology.  Rare circulating blasts are seen.    Bone marrow aspirates and bone marrow trephine core biopsy touch imprints are reviewed.    BONE MARROW DIFFERENTIAL (500 cells on bone marrow biopsy touch imprints )  Percent Cell (reference range)  26.8     Blasts (0 - 1)  0.4      Neutrophil promyelocytes (2 - 4)  37.0    Neutrophils and precursors (54 - 63)  23.6    Erythroid precursors (18 - 24)  1.2      Monocytes (1 - 1.5)  3.8      Eosinophils (1 - 3)  0.2      Basophils (0 - 1)  7.0      Lymphocytes (8 - 12)  0.0      Plasma cells (0 - 1.5)    The aspirate smears are hemodilute, therefore the above differential is performed on the bone marrow trephine core biopsy touch imprints..    Numerous intermediate to large sized blasts increased NC ratios, ovoid to indented nuclei, fine chromatin, one or more prominent nucleoli, and a moderate amount of basophilic cytoplasm are present; a subset contain a few cytoplasmic vacuoles.    Granulocytes are decreased in number, but with progressive and complete maturation; no overt dysplasia seen though a small subset show pseudo-Pelger-Huët nuclear morphology.  Erythrocytes show progressive and complete maturation; no overt dysplasia seen. Megakaryocytes include a subset of small hypolobated forms and forms with widely  nuclear lobes.     TREPHINE SECTIONS:  Hematoxylin and eosin stains are reviewed. The quality of the trephine core biopsy is good, though slight crush artifact is present.  The marrow cellularity is estimated at 80%. The cellular composition reflects the the marrow core biopsy differential. Megakaryocytes are present within the cellular areas; frequent clusters and a few large aggregates are present.  There is increased population of immature appearing cells that forms clusters and large sheets and comprise approximately 40% of bone marrow cellularity    IMMUNOHISTOCHEMISTRY:  Immunohistochemical stains are performed on the paraffin-embedded trephine core with appropriate controls.    CD34 highlights a subset of immature appearing cells [approximately 5%] as well as apparently stains occasional megakaryocytes.       highlights clusters and aggregates of increased blasts comprising approximately  40 to 50% of overall cellularity.    CD61 highlights numerous clusters and large aggregates of megakaryocytes with spectrum of sizes.  A subset of blasts also appears to stain with CD61.    Note: These immunohistochemical stains are deemed medically necessary. Some of the antigens may also be evaluated by flow cytometry. Concurrent evaluation by immunohistochemistry on clot and/or trephine sections is indicated in this case in order to correlate immunophenotype with cell morphology and determine extent of involvement, spatial pattern, and focality of potential disease distribution.    A resident/fellow in an ACGME accredited training program was involved in the initial review, preparation, and/or interpretation of this case.  I, as the senior physician, attest that I: (i) reviewed patient clinical records if indicated; (ii) reviewed relevant lab test results; (iii) examined the relevant preparation(s) for the specimen(s); and (iv) agree with the report, diagnosis(es), and interpretation as documented by the resident/fellow and edited/confirmed by me. Reporting resident/fellow: Roselia Klein DO, MLS([facility])      Gross Description      Procedure/Gross Description   Aspirate(s) and trephine(s) procured by MELO Andujar    Specimen sent for Special Studies:         Flow Cytometry: right aspirate        Cytogenetics: right aspirate        Molecular Diagnostics: right aspirate          Biopsy aspiration site: right posterior iliac crest                                                      (Reference Range)          Amount of aspirate           4.3   mL        Fat and P.V. cell layer        0    %               (1 - 3)        Particles                             0   %        Myeloid-erythroid layer    trace    %               (5 - 8)          Clot Section: no    Trephine biopsy site: right posterior iliac crest    Designated right posterior iliac crest is 1 cylinder of gritty tissue, labeled with the patient's name and  hospital number, obtained with 11 gauge needle and a length of 11 mm; entirely submitted in 1 cassette; acetic zinc formalin fixed, decalcified, processed, and stained for hematoxylin and eosin per laboratory protocol.        MCRS Yes (A)    Performing Labs      The technical component of this testing was completed at [facility]         CSF Studies       Recent Labs   Lab Test 11/04/22  1501   CCOL Colorless   CAPP Clear   CWBC 0   CRBC 0   CCOM Essentially acellular specimen. Negative for blasts. Please correlate with flow cytometry case [id number] and cytology case [id number].   Fabio Cavazos MD on 11/7/2022 at 9:57 AM   Reviewed by JUVENTINO CORTEZ, Hematopathology Fellow       Recent Labs   Lab Test 11/04/22  1501   CGLU 65       Recent Labs   Lab Test 11/04/22  1501   CTP 21.8       Yahaira understood the above assessment and recommendations.  Multiple questions answered.  No barriers to learning identified.      Known issues that I take into account for medical decisions, with salient changes to the plan considering these complexities noted above.    Patient Active Problem List   Diagnosis     Acute myeloid leukemia in remission (H)       ------------------------------------------------------------------------------------------------------------------------------------------------    Patient Care Team       Relationship Specialty Notifications Start End    Lyndon Ross PCP - General   9/28/11     Phone: [phone] Fax: [phone] [address] [facility] [address]    Charlie Worrell MD MD Ophthalmology  2/14/20     Phone: [phone] Fax: [phone] [address] [facility] [address]    Lorin Monsivais APRN CNP Nurse Practitioner Radiation Oncology Admissions 10/17/22     Phone: [phone] Fax: [phone]         [address] [place] [address]    Paola Arellano MD Assigned  Cancer Care Provider   11/12/22     Phone: [phone] Fax: [phone] [address] [facility] [address] [facility] [address]    Danitza Bowles MSW  BMT - Adult Admissions 11/14/22     Phone: [phone]         Chapincito Car RN BMT Nurse Coordinator   12/1/22     Meggan Falcon MD BMT Physician Transplant  12/9/22     Phone: [phone] Fax: [phone] [address] [facility] [address] [facility] [address]           1-2 cups/cans per day

## 2023-01-24 ENCOUNTER — APPOINTMENT (OUTPATIENT)
Dept: ORTHOPEDIC SURGERY | Facility: CLINIC | Age: 38
End: 2023-01-24
Payer: COMMERCIAL

## 2023-01-24 VITALS — DIASTOLIC BLOOD PRESSURE: 79 MMHG | SYSTOLIC BLOOD PRESSURE: 124 MMHG | HEART RATE: 83 BPM

## 2023-01-24 PROCEDURE — 99024 POSTOP FOLLOW-UP VISIT: CPT

## 2023-01-24 NOTE — HISTORY OF PRESENT ILLNESS
[de-identified] : 6 weeks status post left ACL reconstruction and medial meniscus repair.  He is doing well overall.  He really has no pain in the knee.  He says that a few days ago he slept without the brace for the first time and he felt a little bit of pain on the medial aspect of the knee but this went away by itself.  He has been doing PT and has been progressing his range of motion.  He is also able to raise his leg.  He says the numbness around his incisions is improving.  He denies any calf pain or swelling. [de-identified] : General: No apparent distress\par Cardiovascular: extremities warm and well-perfused, no cyanosis\par Pulmonary: non labored respirations\par \par Left knee:\par Mild swelling, no effusion\par Incisions well-healed\par Negative Lachman, negative anterior drawer\par No tenderness to palpation about knee\par Range of motion: 0 to 95 degrees of flexion [de-identified] : 6 weeks status post left ACL reconstruction and medial meniscus repair [de-identified] : He is doing very well overall.  Discussed with him that he should continue with the PT per the protocol.  The goal is over the next 6 weeks he should wean himself out of the brace completely and he should achieve full knee range of motion.  He should also work towards straight leg raising without lag.  He should also work on patella mobilizations.  I will see him back in 6 weeks for repeat evaluation.  All questions answered.

## 2023-03-14 ENCOUNTER — APPOINTMENT (OUTPATIENT)
Dept: ORTHOPEDIC SURGERY | Facility: CLINIC | Age: 38
End: 2023-03-14
Payer: COMMERCIAL

## 2023-03-14 VITALS — DIASTOLIC BLOOD PRESSURE: 80 MMHG | HEART RATE: 65 BPM | SYSTOLIC BLOOD PRESSURE: 126 MMHG

## 2023-03-14 PROCEDURE — 99213 OFFICE O/P EST LOW 20 MIN: CPT

## 2023-08-08 NOTE — PHYSICAL THERAPY INITIAL EVALUATION ADULT - RANGE OF MOTION EXAMINATION, REHAB EVAL
What Type Of Note Output Would You Prefer (Optional)?: Bullet Format
What Is The Reason For Today's Visit?: Full Body Skin Examination
What Is The Reason For Today's Visit? (Being Monitored For X): the re-examination of lesions previously examined
left knee n/t due to surgical precautions/bilateral upper extremity ROM was WFL (within functional limits)/Right LE ROM was WFL (within functional limits)/deficits as listed below

## 2024-02-20 ENCOUNTER — NON-APPOINTMENT (OUTPATIENT)
Age: 39
End: 2024-02-20

## 2024-02-21 ENCOUNTER — APPOINTMENT (OUTPATIENT)
Dept: ORTHOPEDIC SURGERY | Facility: CLINIC | Age: 39
End: 2024-02-21
Payer: COMMERCIAL

## 2024-02-21 VITALS
WEIGHT: 180 LBS | HEART RATE: 84 BPM | SYSTOLIC BLOOD PRESSURE: 152 MMHG | BODY MASS INDEX: 26.66 KG/M2 | HEIGHT: 69 IN | DIASTOLIC BLOOD PRESSURE: 89 MMHG

## 2024-02-21 DIAGNOSIS — S83.412A SPRAIN OF MEDIAL COLLATERAL LIGAMENT OF LEFT KNEE, INITIAL ENCOUNTER: ICD-10-CM

## 2024-02-21 PROCEDURE — 99213 OFFICE O/P EST LOW 20 MIN: CPT

## 2024-02-21 RX ORDER — MELOXICAM 15 MG/1
15 TABLET ORAL DAILY
Qty: 10 | Refills: 1 | Status: ACTIVE | COMMUNITY
Start: 2024-02-21 | End: 1900-01-01

## 2024-02-22 NOTE — HISTORY OF PRESENT ILLNESS
[de-identified] : MOSHE LA  is a 38 year old M who presents for follow up of his left knee. He is s/p left knee ACL reconstruction with hamstring autograft and medial meniscus repair on 12/8/22. He was last seen in March 2023. After that he continued PT until August 2023 and was doing well. he had no issues with the knee. 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 He was doing well until last week when he was shoveling snow and felt his knee give out with the knee buckling inward. He says that initially he had more severe pain on the medial aspect of the knee. Over the next few days the pain has been improving, but he does still have some pain on the medial aspect of the knee. He denies any swelling of the knee.

## 2024-02-22 NOTE — PHYSICAL EXAM
[de-identified] : General: No apparent distress Cardiovascular: extremities warm and well-perfused, no cyanosis Pulmonary: non labored respirations  Left knee: No swelling, no effusion mild medial joint line tenderness to palpation, TTP about MCL slight opening with valgus stress at 30 deg flexion Negative Lachman, negative anterior drawer Range of motion: 0 to 130 degrees of flexion

## 2024-02-22 NOTE — DISCUSSION/SUMMARY
[de-identified] : Approximately 14 months s/p left knee ACL reconstruction with hamstring autograft and medial meniscus repair with likely MCL sprain. Based on his mechanism of injury and clinical exam, the ACL reconstruction appears intact. Unlikely to be retear of meniscus as he did not develop any effusion in the knee and the pain has been improving over a few days. Given the valgus mechanism, likely MCL sprain that is improving. Recommend PT to help with stretching and strengthening.  I will prescribe him meloxicam 15 mg daily to be taken with food for the next 10 days to help her with the pain. I discussed with them that they should not take this while also taking Aleve (Naprosyn), Motrin/ Advil (Ibuprofen), Toradol (ketoralac). They must stop taking it if they develops stomach pain, increased bleeding or bruising and they should follow-up with their primary care doctor for routine blood work including kidney function to monitor its effect. While it Is not a habit-forming substance, it should only be taken as needed and to discontinue use once symptoms have resolved.  Follow up in 3-4 weeks. All questions answered, he expressed understanding of the diagnosis and treatment plan.

## 2024-05-15 NOTE — BRIEF OPERATIVE NOTE - NSICDXBRIEFOPLAUNCH_GEN_ALL_CORE
<--- Click to Launch ICDx for PreOp, PostOp and Procedure Detail Level: Zone Render In Strict Bullet Format?: No Initiate Treatment: Doxycycline 100mg take 1 capsule once a day

## 2024-06-04 ENCOUNTER — APPOINTMENT (OUTPATIENT)
Dept: ORTHOPEDIC SURGERY | Facility: CLINIC | Age: 39
End: 2024-06-04
Payer: COMMERCIAL

## 2024-06-04 VITALS
SYSTOLIC BLOOD PRESSURE: 127 MMHG | HEART RATE: 66 BPM | HEIGHT: 69 IN | BODY MASS INDEX: 25.92 KG/M2 | DIASTOLIC BLOOD PRESSURE: 81 MMHG | WEIGHT: 175 LBS

## 2024-06-04 DIAGNOSIS — S83.242A OTHER TEAR OF MEDIAL MENISCUS, CURRENT INJURY, LEFT KNEE, INITIAL ENCOUNTER: ICD-10-CM

## 2024-06-04 PROCEDURE — 99213 OFFICE O/P EST LOW 20 MIN: CPT

## 2024-06-05 NOTE — REASON FOR VISIT
[Follow-Up Visit] : a follow-up visit for [Knee Pain] : knee pain [Aftercare Following Surgery] : aftercare following surgery

## 2024-06-05 NOTE — HISTORY OF PRESENT ILLNESS
[de-identified] : Interval Hisitory: 6/4/24: He returns for follow up of his left knee. Since the last visit, he has gone to PT, but continues to feel pain. He localizes it to the medial aspect of the knee, worse with bending and certain movements. He also feels pain when the PT presses on a certain area. He denies any swelling about the knee.  2/21/24: MOSHE LA  is a 38 year old M who presents for follow up of his left knee. He is s/p left knee ACL reconstruction with hamstring autograft and medial meniscus repair on 12/8/22. He was last seen in March 2023. After that he continued PT until August 2023 and was doing well. he had no issues with the knee. 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 He was doing well until last week when he was shoveling snow and felt his knee give out with the knee buckling inward. He says that initially he had more severe pain on the medial aspect of the knee. Over the next few days the pain has been improving, but he does still have some pain on the medial aspect of the knee. He denies any swelling of the knee.

## 2024-06-05 NOTE — PHYSICAL EXAM
[de-identified] : General: No apparent distress Cardiovascular: extremities warm and well-perfused, no cyanosis Pulmonary: non labored respirations  Left knee: incisions healed No swelling, no effusion medial joint line tenderness to palpation, TTP No opening with valgus stress at 30 deg flexion Negative Lachman, negative anterior drawer Range of motion: 0 to 130 degrees of flexion

## 2024-06-05 NOTE — DISCUSSION/SUMMARY
[de-identified] : Approximately 18 months s/p left knee ACL reconstruction with hamstring autograft and medial meniscus repair - continues to have pain despite PT - may have recurrent medial meniscus tear given his new trauma - recommend MRI to evaluate for this further - f/u after MRI complete  I have personally obtained the history, reviewed the ROS as noted, and performed the physical examination today. The patient and I discussed the assessment and options and developed the plan. All questions were answered and the patient stated their understanding of the treatment plan and appreciation of the visit.  My cumulative time spent on this patient's visit included: Preparation for the visit, review of the medical records, review of pertinent diagnostic studies, examination and counseling of the patient on the above diagnosis, treatment plan and prognosis, orders of diagnostic tests, medications and/or appropriate procedures and documentation in the medical records of today's visit.  This note was generated using dragon medical dictation software.  A reasonable effort has been made for proofreading its contents, but typos may still remain.  If there are any questions or points of clarification needed please notify my office.

## 2024-06-12 ENCOUNTER — OUTPATIENT (OUTPATIENT)
Dept: OUTPATIENT SERVICES | Facility: HOSPITAL | Age: 39
LOS: 1 days | End: 2024-06-12
Payer: COMMERCIAL

## 2024-06-12 ENCOUNTER — APPOINTMENT (OUTPATIENT)
Dept: MRI IMAGING | Facility: CLINIC | Age: 39
End: 2024-06-12
Payer: COMMERCIAL

## 2024-06-12 DIAGNOSIS — Z98.890 OTHER SPECIFIED POSTPROCEDURAL STATES: ICD-10-CM

## 2024-06-12 DIAGNOSIS — S83.242A OTHER TEAR OF MEDIAL MENISCUS, CURRENT INJURY, LEFT KNEE, INITIAL ENCOUNTER: ICD-10-CM

## 2024-06-12 DIAGNOSIS — Z98.890 OTHER SPECIFIED POSTPROCEDURAL STATES: Chronic | ICD-10-CM

## 2024-06-12 DIAGNOSIS — S83.412A SPRAIN OF MEDIAL COLLATERAL LIGAMENT OF LEFT KNEE, INITIAL ENCOUNTER: ICD-10-CM

## 2024-06-12 PROCEDURE — 73721 MRI JNT OF LWR EXTRE W/O DYE: CPT

## 2024-06-12 PROCEDURE — 73721 MRI JNT OF LWR EXTRE W/O DYE: CPT | Mod: 26,LT

## 2024-06-18 ENCOUNTER — APPOINTMENT (OUTPATIENT)
Dept: ORTHOPEDIC SURGERY | Facility: CLINIC | Age: 39
End: 2024-06-18
Payer: COMMERCIAL

## 2024-06-18 VITALS — HEART RATE: 65 BPM | SYSTOLIC BLOOD PRESSURE: 98 MMHG | DIASTOLIC BLOOD PRESSURE: 61 MMHG

## 2024-06-18 PROCEDURE — 99213 OFFICE O/P EST LOW 20 MIN: CPT

## 2024-06-18 NOTE — HISTORY OF PRESENT ILLNESS
[de-identified] : Interval History: 6/18/2024: He returns for follow-up of his left knee and for MRI review.  He says the pain has been about the same as it was before.  He localizes the pain to the medial aspect of the knee.  He has done physical therapy but continues to have pain.  6/4/24: He returns for follow up of his left knee. Since the last visit, he has gone to PT, but continues to feel pain. He localizes it to the medial aspect of the knee, worse with bending and certain movements. He also feels pain when the PT presses on a certain area. He denies any swelling about the knee.  2/21/24: MOSHE LA  is a 38 year old M who presents for follow up of his left knee. He is s/p left knee ACL reconstruction with hamstring autograft and medial meniscus repair on 12/8/22. He was last seen in March 2023. After that he continued PT until August 2023 and was doing well. he had no issues with the knee. 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 He was doing well until last week when he was shoveling snow and felt his knee give out with the knee buckling inward. He says that initially he had more severe pain on the medial aspect of the knee. Over the next few days the pain has been improving, but he does still have some pain on the medial aspect of the knee. He denies any swelling of the knee.

## 2024-06-18 NOTE — DISCUSSION/SUMMARY
[de-identified] : Left knee medial meniscus tear with displaced fragment into the medial gutter, intact ACL reconstruction -We discussed the details of the diagnosis -The previous meniscus tear appears to be healed but he appears to have a new meniscus tear with a displaced fragment into the medial gutter -He has already tried a full course of physical therapy but continues to have significant pain in the knee and inability to function -We discussed continued treatment options including continued nonoperative treatment with physical therapy versus surgical treatment which would include arthroscopic partial medial meniscectomy versus medial meniscus repair.  Discussed that this would have the benefit of addressing the displaced fragment of meniscus.  We discussed the expected postoperative rehab.  We also discussed risks and benefits of surgery including but not limited to infection, bleeding, damage to nerves or blood vessel, need for further surgery, failure of repair, nonhealing of repair, DVT/PE. -After this discussion he elected to proceed with surgery, he will work with my office to schedule a time for surgery and obtain any medical clearances necessary.  I have personally obtained the history, reviewed the ROS as noted, and performed the physical examination today. The patient and I discussed the assessment and options and developed the plan. All questions were answered and the patient stated their understanding of the treatment plan and appreciation of the visit.  My cumulative time spent on this patient's visit included: Preparation for the visit, review of the medical records, review of pertinent diagnostic studies, examination and counseling of the patient on the above diagnosis, treatment plan and prognosis, orders of diagnostic tests, medications and/or appropriate procedures and documentation in the medical records of today's visit.  This note was generated using dragon medical dictation software.  A reasonable effort has been made for proofreading its contents, but typos may still remain.  If there are any questions or points of clarification needed please notify my office.

## 2024-06-18 NOTE — PHYSICAL EXAM
[de-identified] : General: No apparent distress Cardiovascular: extremities warm and well-perfused, no cyanosis Pulmonary: non labored respirations  Left knee: incisions healed No swelling, no effusion medial joint line tenderness to palpation No opening with valgus stress at 30 deg flexion Negative Lachman, negative anterior drawer Range of motion: 0 to 130 degrees of flexion [de-identified] : MRI images reviewed with patient

## 2024-06-19 ENCOUNTER — NON-APPOINTMENT (OUTPATIENT)
Age: 39
End: 2024-06-19

## 2024-06-19 PROBLEM — Z86.16 PERSONAL HISTORY OF COVID-19: Chronic | Status: ACTIVE | Noted: 2022-11-30

## 2024-06-24 ENCOUNTER — OUTPATIENT (OUTPATIENT)
Dept: OUTPATIENT SERVICES | Facility: HOSPITAL | Age: 39
LOS: 1 days | End: 2024-06-24
Payer: COMMERCIAL

## 2024-06-24 VITALS
DIASTOLIC BLOOD PRESSURE: 80 MMHG | WEIGHT: 176.37 LBS | RESPIRATION RATE: 13 BRPM | OXYGEN SATURATION: 99 % | HEART RATE: 56 BPM | SYSTOLIC BLOOD PRESSURE: 130 MMHG | TEMPERATURE: 98 F | HEIGHT: 69 IN

## 2024-06-24 DIAGNOSIS — S83.242A OTHER TEAR OF MEDIAL MENISCUS, CURRENT INJURY, LEFT KNEE, INITIAL ENCOUNTER: ICD-10-CM

## 2024-06-24 DIAGNOSIS — Z98.890 OTHER SPECIFIED POSTPROCEDURAL STATES: Chronic | ICD-10-CM

## 2024-06-24 DIAGNOSIS — S89.92XA UNSPECIFIED INJURY OF LEFT LOWER LEG, INITIAL ENCOUNTER: ICD-10-CM

## 2024-06-24 DIAGNOSIS — Z01.818 ENCOUNTER FOR OTHER PREPROCEDURAL EXAMINATION: ICD-10-CM

## 2024-06-24 PROBLEM — Z86.16 PERSONAL HISTORY OF COVID-19: Chronic | Status: INACTIVE | Noted: 2022-11-30 | Resolved: 2024-06-24

## 2024-06-24 PROBLEM — Z92.29 PERSONAL HISTORY OF OTHER DRUG THERAPY: Chronic | Status: INACTIVE | Noted: 2022-11-30 | Resolved: 2024-06-24

## 2024-06-24 PROCEDURE — G0463: CPT

## 2024-06-24 RX ORDER — ASPIRIN/CALCIUM CARB/MAGNESIUM 324 MG
1 TABLET ORAL
Qty: 0 | Refills: 0 | DISCHARGE

## 2024-06-24 RX ORDER — POLYETHYLENE GLYCOL 3350 17 G/17G
1 POWDER, FOR SOLUTION ORAL
Qty: 0 | Refills: 0 | DISCHARGE

## 2024-06-24 RX ORDER — ACETAMINOPHEN 500 MG
2 TABLET ORAL
Qty: 0 | Refills: 0 | DISCHARGE

## 2024-06-26 ENCOUNTER — TRANSCRIPTION ENCOUNTER (OUTPATIENT)
Age: 39
End: 2024-06-26

## 2024-06-27 ENCOUNTER — TRANSCRIPTION ENCOUNTER (OUTPATIENT)
Age: 39
End: 2024-06-27

## 2024-06-27 ENCOUNTER — OUTPATIENT (OUTPATIENT)
Dept: OUTPATIENT SERVICES | Facility: HOSPITAL | Age: 39
LOS: 1 days | End: 2024-06-27
Payer: COMMERCIAL

## 2024-06-27 ENCOUNTER — APPOINTMENT (OUTPATIENT)
Dept: ORTHOPEDIC SURGERY | Facility: HOSPITAL | Age: 39
End: 2024-06-27

## 2024-06-27 VITALS
SYSTOLIC BLOOD PRESSURE: 132 MMHG | WEIGHT: 175.93 LBS | TEMPERATURE: 97 F | HEIGHT: 69 IN | DIASTOLIC BLOOD PRESSURE: 84 MMHG | RESPIRATION RATE: 14 BRPM | OXYGEN SATURATION: 100 % | HEART RATE: 54 BPM

## 2024-06-27 VITALS
DIASTOLIC BLOOD PRESSURE: 78 MMHG | SYSTOLIC BLOOD PRESSURE: 138 MMHG | RESPIRATION RATE: 13 BRPM | OXYGEN SATURATION: 100 % | HEART RATE: 78 BPM

## 2024-06-27 DIAGNOSIS — Z98.890 OTHER SPECIFIED POSTPROCEDURAL STATES: Chronic | ICD-10-CM

## 2024-06-27 DIAGNOSIS — S83.242A OTHER TEAR OF MEDIAL MENISCUS, CURRENT INJURY, LEFT KNEE, INITIAL ENCOUNTER: ICD-10-CM

## 2024-06-27 PROCEDURE — 29881 ARTHRS KNE SRG MNISECTMY M/L: CPT | Mod: LT

## 2024-06-27 PROCEDURE — 97161 PT EVAL LOW COMPLEX 20 MIN: CPT

## 2024-06-27 RX ORDER — ONDANSETRON HYDROCHLORIDE 2 MG/ML
4 INJECTION INTRAMUSCULAR; INTRAVENOUS ONCE
Refills: 0 | Status: DISCONTINUED | OUTPATIENT
Start: 2024-06-27 | End: 2024-06-27

## 2024-06-27 RX ORDER — ASPIRIN 325 MG/1
1 TABLET, FILM COATED ORAL
Qty: 56 | Refills: 0
Start: 2024-06-27 | End: 2024-07-24

## 2024-06-27 RX ORDER — DEXTROSE MONOHYDRATE AND SODIUM CHLORIDE 5; .3 G/100ML; G/100ML
1000 INJECTION, SOLUTION INTRAVENOUS
Refills: 0 | Status: DISCONTINUED | OUTPATIENT
Start: 2024-06-27 | End: 2024-06-27

## 2024-06-27 RX ORDER — CEFAZOLIN 10 G/1
2000 INJECTION, POWDER, FOR SOLUTION INTRAVENOUS ONCE
Refills: 0 | Status: COMPLETED | OUTPATIENT
Start: 2024-06-27 | End: 2024-06-27

## 2024-06-27 RX ORDER — OXYCODONE HYDROCHLORIDE 100 MG/5ML
5 SOLUTION ORAL ONCE
Refills: 0 | Status: DISCONTINUED | OUTPATIENT
Start: 2024-06-27 | End: 2024-06-27

## 2024-06-27 RX ORDER — HYDROMORPHONE HCL 0.2 MG/ML
0.2 INJECTION, SOLUTION INTRAVENOUS
Refills: 0 | Status: DISCONTINUED | OUTPATIENT
Start: 2024-06-27 | End: 2024-06-27

## 2024-06-27 RX ORDER — APREPITANT 125MG-80MG
40 KIT ORAL ONCE
Refills: 0 | Status: COMPLETED | OUTPATIENT
Start: 2024-06-27 | End: 2024-06-27

## 2024-06-27 RX ORDER — HYDROMORPHONE HCL 0.2 MG/ML
0.5 INJECTION, SOLUTION INTRAVENOUS
Refills: 0 | Status: DISCONTINUED | OUTPATIENT
Start: 2024-06-27 | End: 2024-06-27

## 2024-06-27 RX ORDER — OXYCODONE AND ACETAMINOPHEN 5; 325 MG/1; MG/1
1 TABLET ORAL
Qty: 10 | Refills: 0
Start: 2024-06-27

## 2024-06-27 RX ADMIN — DEXTROSE MONOHYDRATE AND SODIUM CHLORIDE 80 MILLILITER(S): 5; .3 INJECTION, SOLUTION INTRAVENOUS at 16:39

## 2024-06-27 RX ADMIN — Medication 1 APPLICATION(S): at 12:40

## 2024-06-27 RX ADMIN — APREPITANT 40 MILLIGRAM(S): KIT at 12:39

## 2024-07-09 ENCOUNTER — APPOINTMENT (OUTPATIENT)
Dept: ORTHOPEDIC SURGERY | Facility: CLINIC | Age: 39
End: 2024-07-09
Payer: COMMERCIAL

## 2024-07-09 VITALS — SYSTOLIC BLOOD PRESSURE: 114 MMHG | DIASTOLIC BLOOD PRESSURE: 70 MMHG | HEART RATE: 79 BPM

## 2024-07-09 PROCEDURE — 99024 POSTOP FOLLOW-UP VISIT: CPT

## (undated) DEVICE — S&N FASTFIX 360 STRAIGHT KNOT PUSHER SET

## (undated) DEVICE — NDL SPINAL 18G X 3.5" (PINK)

## (undated) DEVICE — DRAPE LIGHT HANDLE COVER (GREEN)

## (undated) DEVICE — SOL IRR BAG NS 0.9% 3000ML

## (undated) DEVICE — SUT MONOSOF 4-0 18" C-13

## (undated) DEVICE — ELCTR BOVIE TIP BLADE INSULATED 3" EDGE

## (undated) DEVICE — KNOT PUSHER WITH PORTAL SKID

## (undated) DEVICE — CONTAINER SPECIMEN 4OZ

## (undated) DEVICE — GLV 8 PROTEXIS (WHITE)

## (undated) DEVICE — S&N ARTHROCARE WAND COBLATION WEREWOLF FLOW 90

## (undated) DEVICE — VENODYNE/SCD SLEEVE CALF MEDIUM

## (undated) DEVICE — ELCTR BOVIE PENCIL BLADE 10FT

## (undated) DEVICE — CANNULA  ARTHREX PASSPORT BUTTON 10MM X 4CM

## (undated) DEVICE — DVC SUCTION FLR PUDDLE GUPPY

## (undated) DEVICE — DRSG COBAN 6"

## (undated) DEVICE — DRSG STOCKINETTE TUBULAR COTTON 2PLY 6X72"

## (undated) DEVICE — DRAPE 3/4 SHEET 52X76"

## (undated) DEVICE — SUT ORTHOCORD 2 36" MO-6

## (undated) DEVICE — TUBING SUCTION 20FT

## (undated) DEVICE — GLV 8 PROTEXIS (BLUE)

## (undated) DEVICE — SHAVER BLADE LINVATEC FULL RADIUS RESECTOR 3.5MM

## (undated) DEVICE — PACK KNEE ARTHROSCOPY

## (undated) DEVICE — SYR LUER LOK 10CC

## (undated) DEVICE — SUT PROLENE 3-0 36" RB-1

## (undated) DEVICE — SUT POLYSORB 2-0 30" C-15 UNDYED

## (undated) DEVICE — SUT FIBERWIRE #2 38" STRAND 1 BLUE T-5 TAPER

## (undated) DEVICE — TUBING DEPUY MITEK FMS VUE INFLOW

## (undated) DEVICE — TOURNIQUET ESMARK 6"

## (undated) DEVICE — LAP PAD W RING 18 X 18"

## (undated) DEVICE — DRAPE TOWEL BLUE 17" X 24"

## (undated) DEVICE — S&N FASTFIX 360 CURVED KNOT PUSHER SET

## (undated) DEVICE — SUT FIBERSTICK SIZE 2 50" BLUE

## (undated) DEVICE — POSITIONER STRAP ARMBOARD VELCRO TS-30

## (undated) DEVICE — SOLIDIFIER CANN EXPRESS 3K

## (undated) DEVICE — SUT MONOSOF 3-0 18" P-12

## (undated) DEVICE — LAP PAD 18 X 18"

## (undated) DEVICE — SYR ASEPTO

## (undated) DEVICE — ARTHREX FLIPCUTTER III DRILL

## (undated) DEVICE — DRSG WEBRIL 6"

## (undated) DEVICE — TUBING SET GRAVITY 4 SPIKE

## (undated) DEVICE — WARMING BLANKET UPPER ADULT

## (undated) DEVICE — GLV 7.5 PROTEXIS (WHITE)

## (undated) DEVICE — PACK BASIC TIBURON LTXF STRL

## (undated) DEVICE — SUT POLYSORB 0 30" GS-10 UNDYED

## (undated) DEVICE — SYM-ARTHROSCOPY SHAVER: Type: DURABLE MEDICAL EQUIPMENT

## (undated) DEVICE — SAW BLADE MICROAIRE SAGITTAL 9.4MMX25.4MMX0.6MM

## (undated) DEVICE — SUT WIRE # 2 TIGERLOOP

## (undated) DEVICE — BLADE SCALPEL SAFETYLOCK #15

## (undated) DEVICE — SUT TAPE TIGERLOOP W NDL WHITE / BLACK

## (undated) DEVICE — DRAPE INSTRUMENT POUCH 6.75" X 11"

## (undated) DEVICE — SUCTION YANKAUER NO CONTROL VENT

## (undated) DEVICE — BLADE SCALPEL SAFETYLOCK #10

## (undated) DEVICE — POSITIONER STIRRUP STRAP W SLIP RING 19X3.5"

## (undated) DEVICE — TUBING DEPUY MITEK FMS OUTFLOW

## (undated) DEVICE — POSITIONER PATIENT SAFETY STRAP 3X60"

## (undated) DEVICE — SUT SURGIPRO 0 30" GS-22

## (undated) DEVICE — TOURNIQUET CUFF 34" DUAL PORT W PLC

## (undated) DEVICE — SPLINT IMMOBILIZER 3-PANEL KNEE 20"